# Patient Record
Sex: FEMALE | Employment: UNEMPLOYED | URBAN - METROPOLITAN AREA
[De-identification: names, ages, dates, MRNs, and addresses within clinical notes are randomized per-mention and may not be internally consistent; named-entity substitution may affect disease eponyms.]

---
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
## 2022-11-08 ENCOUNTER — TELEPHONE (OUTPATIENT)
Dept: PEDIATRICS CLINIC | Age: 22
End: 2022-11-08

## 2022-11-08 NOTE — TELEPHONE ENCOUNTER
----- Message from Gladis Drummond sent at 11/8/2022  1:44 PM EST -----  Subject: Message to Provider    QUESTIONS  Information for Provider? This pt is in need of her immunization and   vaccine record from this office. Pt was last seen in 2017. She was   inquiring on how she needs to go about getting this info from this   practice. She states that she has attempted to access it through [address]   but its requesting an access code. Please return call with any   instructions about this.   ---------------------------------------------------------------------------  --------------  Celi Leo INFO  [phone]; OK to leave message on voicemail  ---------------------------------------------------------------------------  --------------  SCRIPT ANSWERS  Relationship to Patient?  Self

## 2022-11-08 NOTE — TELEPHONE ENCOUNTER
I returned pt call, confirmed w/ her that Dr. Rocio Moon was her PCP and advised that she cold request the records online through [employer] and waked through how to do that.